# Patient Record
Sex: MALE | Race: BLACK OR AFRICAN AMERICAN
[De-identification: names, ages, dates, MRNs, and addresses within clinical notes are randomized per-mention and may not be internally consistent; named-entity substitution may affect disease eponyms.]

---

## 2019-03-15 ENCOUNTER — HOSPITAL ENCOUNTER (EMERGENCY)
Dept: HOSPITAL 74 - JERFT | Age: 49
Discharge: HOME | End: 2019-03-15
Payer: COMMERCIAL

## 2019-03-15 VITALS — HEART RATE: 82 BPM | DIASTOLIC BLOOD PRESSURE: 89 MMHG | SYSTOLIC BLOOD PRESSURE: 157 MMHG | TEMPERATURE: 98.1 F

## 2019-03-15 VITALS — BODY MASS INDEX: 43 KG/M2

## 2019-03-15 DIAGNOSIS — I11.0: ICD-10-CM
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
## 2019-03-15 DIAGNOSIS — I50.9: ICD-10-CM

## 2019-03-15 DIAGNOSIS — S53.492A: Primary | ICD-10-CM
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
## 2019-03-15 DIAGNOSIS — Z79.01: ICD-10-CM
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
## 2019-03-15 DIAGNOSIS — Z86.711: ICD-10-CM

## 2019-03-15 NOTE — PDOC
Rapid Medical Evaluation


Chief Complaint: Pain, Acute


Time Seen by Provider: 03/15/19 14:52


Medical Evaluation: 


 Allergies











Allergy/AdvReac Type Severity Reaction Status Date / Time


 


No Known Allergies Allergy   Verified 03/15/19 14:52











03/15/19 14:52


I performed a brief in-person evaluation of this patient.


Chief complaint: Reports hyperextended left elbow during martial arts on 

wednesday


Pertinent physical exam findings: Tenderness and limited ROM of elbow


I have ordered the following: Left elbow xray





Patient to proceed to the ED for further evaluation.





**Discharge Disposition





- Diagnosis


 Elbow injury








- Referrals





- Patient Instructions





- Post Discharge Activity

## 2019-03-15 NOTE — PDOC
History of Present Illness





- General


Chief Complaint: Pain, Acute


Stated Complaint: RT ELBOW INJURY


Time Seen by Provider: 03/15/19 14:52


History Source: Patient


Exam Limitations: No Limitations





- History of Present Illness


Initial Comments: 





03/15/19 16:16


Patient states was performing some martial arts on Monday this week when he 

hyperextended his left elbow. States dislocated and reduced itself. This has 

happened in the past to his right arm with minimal consequence. States was 

hoping with icing and rest would resolve however has progressively worsen. 

Patient is taking Xaralta for history of PE which is probably caused excessive 

swelling


Occurred: reports: yesterday


Severity: reports: moderate, severe


Pain Location: reports: upper extremity (left elbow)


Loss of Consciousness: no loss of consciousness


Associated Symptoms (Fall): denies symptoms





Past History





- Travel


Traveled outside of the country in the last 30 days: No (left elbow)


Close contact w/someone who was outside of country & ill: No





- Past Medical History


Allergies/Adverse Reactions: 


 Allergies











Allergy/AdvReac Type Severity Reaction Status Date / Time


 


No Known Allergies Allergy   Verified 03/15/19 14:52











Home Medications: 


Ambulatory Orders





Lisinopril [Prinivil] 20 mg PO DAILY 03/15/19 


Metoprolol Succinate 100 mg PO ASDIR 03/15/19 


Oxycodone HCl/Acetaminophen [Percocet 5-325 mg Tablet -] 1 - 2 tab PO Q4H PRN #

10 tablet MDD 4 03/15/19 








COPD: No


CHF: Yes


HTN: Yes





- Immunization History


Immunization Up to Date: Yes





- Suicide/Smoking/Psychosocial Hx


Smoking History: Never smoked


Information on smoking cessation initiated: No


Hx Alcohol Use: No


Drug/Substance Use Hx: No





**Review of Systems





- Review of Systems


Able to Perform ROS?: Yes


Is the patient limited English proficient: Yes


Constitutional: Yes: See HPI.  No: Symptoms Reported, Fever, Malaise


HEENTM: Yes: See HPI.  No: Symptoms Reported


Musculoskeletal: Yes: Symptoms Reported, See HPI, Joint Pain, Joint Swelling (

patient with continued swelling, tenderness to left elbow with range of motion 

difficulty. Has no crepitus or step-offs, but unable to supinate or pronate at 

wrist. Neurovascular intact to fingers with strong grasp but has swelling to 

his left hand as well. Able to contract bicep muscle but difficult due to 

swelling and pain with range of motion.)


Integumentary: Yes: Symptoms Reported, See HPI, Bruising


Neurological: Yes: Symptoms reported, See HPI


All Other Systems: Reviewed and Negative





*Physical Exam





- Vital Signs


 Last Vital Signs











Temp Pulse Resp BP Pulse Ox


 


 98.1 F   82   16   157/89   99 


 


 03/15/19 14:52  03/15/19 14:52  03/15/19 14:52  03/15/19 14:52  03/15/19 14:52














- Physical Exam


General Appearance: Yes: Nourished, Appropriately Dressed, Apparent Distress, 

Mild Distress, Moderate Distress


HEENT: positive: UNIQUE, Normal ENT Inspection, TMs Normal, Pharynx Normal


Neck: positive: Supple.  negative: Tender


Respiratory/Chest: positive: Lungs Clear.  negative: Chest Tender


Gastrointestinal/Abdominal: positive: Soft.  negative: Tender


Extremity: positive: Tender.  negative: Normal Range of Motion


Integumentary: positive: Swelling (left elbow), Ecchymosis, Bruising.  negative

: Normal Color


Neurologic: positive: CNs II-XII NML intact, Fully Oriented, Alert, Normal Mood/

Affect, Normal Response





Moderate Sedation





- Procedure Monitoring


Vital Signs: 


Procedure Monitoring Vital Signs











Temperature  98.1 F   03/15/19 14:52


 


Pulse Rate  82   03/15/19 14:52


 


Respiratory Rate  16   03/15/19 14:52


 


Blood Pressure  157/89   03/15/19 14:52


 


O2 Sat by Pulse Oximetry (%)  99   03/15/19 14:52











Progress Note





- Progress Note


Progress Note: 





Left elbow strain/sprain, sling placed and patient encouraged to follow up with 

orthopedist first thing Monday or return to emergency department for worsening 

swelling, any compartment syndrome symptoms to hand or forearm.





*DC/Admit/Observation/Transfer


Diagnosis at time of Disposition: 


Elbow injury


Qualifiers:


 Encounter type: initial encounter Laterality: left Qualified Code(s): S59.902A 

- Unspecified injury of left elbow, initial encounter








- Discharge Dispostion


Disposition: HOME


Condition at time of disposition: Stable


Decision to Admit order: No





- Prescriptions


Prescriptions: 


Oxycodone HCl/Acetaminophen [Percocet 5-325 mg Tablet -] 1 - 2 tab PO Q4H PRN #

10 tablet MDD 4


 PRN Reason: Pain





- Referrals


Referrals: 


Nora Willard [Primary Care Provider] - 


Jerry Campbell MD [Staff Physician] - 





- Patient Instructions


Printed Discharge Instructions:  How to Use a Sling, DI for Elbow Sprain


Additional Instructions: 


Rest, ice to area on and off for 15 minutes 4-6 times a day


Avoid heavy lifting or exercise until pain and swelling is resolved or until 

further directed


Keep area highly elevated to reduce swelling


Use splints/Ace wrap as directed


Followup with orthopedist in one to 2 days if not improving, 


    if significantly improved may wait one week for followup with orthopedist





May use ibuprofen every 6 hours as needed for pain


May use Percocet for severe pain





- Post Discharge Activity


Forms/Work/School Notes:  Back to Work